# Patient Record
Sex: MALE | Race: BLACK OR AFRICAN AMERICAN | NOT HISPANIC OR LATINO | Employment: OTHER | URBAN - METROPOLITAN AREA
[De-identification: names, ages, dates, MRNs, and addresses within clinical notes are randomized per-mention and may not be internally consistent; named-entity substitution may affect disease eponyms.]

---

## 2024-05-18 ENCOUNTER — HOSPITAL ENCOUNTER (EMERGENCY)
Facility: HOSPITAL | Age: 39
Discharge: HOME | End: 2024-05-18
Attending: EMERGENCY MEDICINE
Payer: MEDICAID

## 2024-05-18 VITALS
HEIGHT: 69 IN | DIASTOLIC BLOOD PRESSURE: 92 MMHG | WEIGHT: 162 LBS | TEMPERATURE: 99 F | RESPIRATION RATE: 18 BRPM | HEART RATE: 71 BPM | BODY MASS INDEX: 23.99 KG/M2 | SYSTOLIC BLOOD PRESSURE: 137 MMHG | OXYGEN SATURATION: 98 %

## 2024-05-18 DIAGNOSIS — S61.411A LACERATION OF RIGHT HAND, FOREIGN BODY PRESENCE UNSPECIFIED, INITIAL ENCOUNTER: Primary | ICD-10-CM

## 2024-05-18 PROCEDURE — 63600175 PHARM REV CODE 636 W HCPCS

## 2024-05-18 PROCEDURE — 12002 RPR S/N/AX/GEN/TRNK2.6-7.5CM: CPT

## 2024-05-18 PROCEDURE — 90715 TDAP VACCINE 7 YRS/> IM: CPT

## 2024-05-18 PROCEDURE — 25000003 PHARM REV CODE 250

## 2024-05-18 PROCEDURE — 90471 IMMUNIZATION ADMIN: CPT

## 2024-05-18 PROCEDURE — 99284 EMERGENCY DEPT VISIT MOD MDM: CPT | Mod: 25

## 2024-05-18 RX ORDER — ACETAMINOPHEN 500 MG
500 TABLET ORAL EVERY 6 HOURS PRN
Qty: 30 TABLET | Refills: 0 | Status: SHIPPED | OUTPATIENT
Start: 2024-05-18

## 2024-05-18 RX ORDER — LIDOCAINE HYDROCHLORIDE 10 MG/ML
10 INJECTION INFILTRATION; PERINEURAL
Status: COMPLETED | OUTPATIENT
Start: 2024-05-18 | End: 2024-05-18

## 2024-05-18 RX ORDER — MUPIROCIN 20 MG/G
1 OINTMENT TOPICAL
Status: COMPLETED | OUTPATIENT
Start: 2024-05-18 | End: 2024-05-18

## 2024-05-18 RX ADMIN — MUPIROCIN 1 TUBE: 20 OINTMENT TOPICAL at 05:05

## 2024-05-18 RX ADMIN — LIDOCAINE HYDROCHLORIDE 10 ML: 10 INJECTION, SOLUTION INFILTRATION; PERINEURAL at 05:05

## 2024-05-18 RX ADMIN — TETANUS TOXOID, REDUCED DIPHTHERIA TOXOID AND ACELLULAR PERTUSSIS VACCINE, ADSORBED 0.5 ML: 5; 2.5; 8; 8; 2.5 SUSPENSION INTRAMUSCULAR at 05:05

## 2024-05-18 NOTE — Clinical Note
"Gio Fentonjeremy Grier was seen and treated in our emergency department on 5/18/2024.  He may return to work on 05/18/2024.       If you have any questions or concerns, please don't hesitate to call.      TERRI Young RN    "

## 2024-05-18 NOTE — Clinical Note
"Gio Fentonjeremy Grier was seen and treated in our emergency department on 5/18/2024.  He may return to work on 05/19/2024.       If you have any questions or concerns, please don't hesitate to call.      TERRI Young RN    "

## 2024-05-18 NOTE — ED PROVIDER NOTES
Encounter Date: 5/18/2024       History     Chief Complaint   Patient presents with    Laceration     Pt reporting he has a lac to right hand after a nail cut it open while moving furniture today. Bleeding controlled, Lac approx 4-5 cm in length. Pt unsure of when last tetanus shot was. Lac dressed in triage.      Pt is a 38-year-old male with no PMH who presents to the emergency department for a laceration to the R hand. Pt was moving a couch through a door and cut his hand on a nail embedded in the couch. Pt denies decreased sensation or strength of the hand or fingers. Pt denies possible foreign body, as the entire nail was retained in the couch. Denies use of blood thinners. Pt is unaware of last Tetanus vaccination. Pt denies fever, chest pain, or palpitations. Denies nausea or vomiting.     The history is provided by the patient. A  was used (Aisha #[id number]).     Review of patient's allergies indicates:  No Known Allergies  History reviewed. No pertinent past medical history.  History reviewed. No pertinent surgical history.  No family history on file.  Social History     Tobacco Use    Smoking status: Never    Smokeless tobacco: Never   Substance Use Topics    Alcohol use: Never    Drug use: Never     Review of Systems   Constitutional:  Negative for chills, diaphoresis and fever.   Respiratory:  Negative for cough, chest tightness, shortness of breath and wheezing.    Cardiovascular:  Negative for chest pain and palpitations.   Gastrointestinal:  Negative for nausea and vomiting.   Skin:  Positive for wound. Negative for color change and pallor.   Neurological:  Negative for dizziness, syncope and weakness.       Physical Exam     Initial Vitals   BP Pulse Resp Temp SpO2   05/18/24 1710 05/18/24 1710 05/18/24 1710 05/18/24 1712 05/18/24 1710   (!) 137/92 71 18 98.7 °F (37.1 °C) 98 %      MAP       --                Physical Exam    Constitutional: He appears well-developed and  well-nourished.   HENT:   Head: Normocephalic and atraumatic.   Cardiovascular:  Normal rate and regular rhythm.     Exam reveals no gallop and no friction rub.       No murmur heard.  Pulmonary/Chest: Breath sounds normal. No respiratory distress. He has no wheezes. He has no rhonchi. He has no rales. He exhibits no tenderness.   Musculoskeletal:         General: Normal range of motion.     Neurological: He is alert and oriented to person, place, and time. He has normal strength. No sensory deficit.   Skin: Skin is warm and dry. Capillary refill takes less than 2 seconds.   Psychiatric: He has a normal mood and affect.       Hand/Wrist    Hand/Wrist inspection additional comments: 6cm linear laceration on the volar surface at the base of the thumb extended to, but not crossing, the PIP joint. The laceration does not extend to the tendon. Pt remains with full sensation of the ulnar and radial surfaces of thumb. No decreased sensation of palm. 5/5 strength of hand. Full ROM of thumb with active and passive motion.       ED Course   Lac Repair    Date/Time: 5/18/2024 7:21 PM    Performed by: Nicolas Amaya PA-C  Authorized by: Arun Matamoros MD    Consent:     Consent obtained:  Verbal    Consent given by:  Patient    Risks, benefits, and alternatives were discussed: yes      Risks discussed:  Infection, pain, retained foreign body, need for additional repair, poor cosmetic result and poor wound healing    Alternatives discussed:  No treatment  Universal protocol:     Procedure explained and questions answered to patient or proxy's satisfaction: yes      Patient identity confirmed:  Verbally with patient  Anesthesia:     Anesthesia method:  Local infiltration    Local anesthetic:  Lidocaine 1% w/o epi  Laceration details:     Location:  Hand    Hand location:  R palm    Length (cm):  6  Treatment:     Area cleansed with:  Povidone-iodine and saline    Amount of cleaning:  Standard  Skin repair:     Repair  method:  Sutures    Suture size:  5-0    Suture material:  Nylon    Suture technique:  Simple interrupted    Number of sutures:  6  Approximation:     Approximation:  Close  Repair type:     Repair type:  Simple  Post-procedure details:     Dressing:  Open (no dressing)    Procedure completion:  Tolerated  Comments:      Laceration repaired with 6 suture. Nonadherent bandage applied. Patient tolerated well.  Instructed patient to keep the area clean and dry and watch out for signs of infection including erythema, warmth, pus discharge, and fevers at home. Instructed patient he will need to return in 7-10 days to have sutures removed, he verbalized understanding.      Labs Reviewed - No data to display       Imaging Results    None          Medications   Tdap (BOOSTRIX) vaccine injection 0.5 mL (0.5 mLs Intramuscular Given 5/18/24 1740)   LIDOcaine HCL 10 mg/ml (1%) injection 10 mL (10 mLs Infiltration Given 5/18/24 1737)   mupirocin 2 % ointment 1 Tube (1 Tube Topical (Top) Given 5/18/24 1738)     Medical Decision Making  Pt is a 38-year-old male with no PMH who presents to the emergency department for a laceration to the R hand. Pt was moving a couch through a door and cut his hand on a nail embedded in the couch. Pt denies decreased sensation or strength of the hand or fingers. Pt denies possible foreign body, as the entire nail was retained in the couch. Denies use of blood thinners. Pt is unaware of last Tetanus vaccination. Pt denies fever, chest pain, or palpitations. Denies nausea or vomiting.   Patient's chart and medical history reviewed.  Patient's vitals reviewed.  They are afebrile, no respiratory distress, nontoxic-appearing in the ED.  This is an evaluation of a 38 y.o. male that presents to the Emergency Department for a Laceration. Physical Exam shows a non-toxic, afebrile, and well appearing male. There is a laceration to the right thenar area. There is no surrounding erythema or increased warmth.  Compartments are soft. There is full ROM of hand against resistance. Equal sensation proximally and distally to the wound. No visible tendon lacerations or bony structures observed on exam. The wound was irrigated and visually inspected with no visible foreign bodies identified. Vital Signs Are Reassuring. Tetanus is not up to date. Tetanus given    RESULTS: . The wound was closed per the procedure note.     My overall impression is Laceration of the right palm. I considered, but at this time, do not suspect cellulitis, compartment syndrome, underlying fracture, no tendon injury, or suspect any retained foreign body at this time.   D/C Information: Laceration, Suture Removal, and Wound Care instructions given. The diagnosis, treatment plan, instructions for follow-up and reevaluation with his PCP or Return to ED for suture removal as well as ED return precautions were discussed and understanding was verbalized.   I discussed with the patient/family the diagnosis, treatment plan, indications for return to the emergency department, and for expected follow-up. The patient/family verbalized an understanding. The patient/family is asked if there are any questions or concerns. We discuss the case, until all issues are addressed to the patient/family's satisfaction. Patient/family understands and is agreeable to the plan.   LINA JOSEPH    DISCLAIMER: This note was prepared with [employer] voice recognition transcription software. Garbled syntax, mangled pronouns, and other bizarre constructions may be attributed to that software system.        Risk  Prescription drug management.                                      Clinical Impression:  Final diagnoses:  [W89.566R] Laceration of right hand, foreign body presence unspecified, initial encounter (Primary)          ED Disposition Condition    Discharge Stable          ED Prescriptions       Medication Sig Dispense Start Date End Date Auth. Provider    acetaminophen (TYLENOL)  500 MG tablet Take 1 tablet (500 mg total) by mouth every 6 (six) hours as needed for Pain. 30 tablet 5/18/2024 -- Nicolas Amaya PA-C          Follow-up Information       Follow up With Specialties Details Why Contact Info    [facility] -  Schedule an appointment as soon as possible for a visit in 1 day for follow up [address] [place] [address]  [phone]      [facility] - Emergency Dept Emergency Medicine Go in 1 week For suture removal [address] [facility] [place] [id number] [phone]             Nicolas Amaya PA-C  05/18/24 1931

## 2024-05-19 NOTE — DISCHARGE INSTRUCTIONS
Please keep your wound clean and dry.  Wash gently with soap and water and apply antibiotic ointment (bacitracin, neosporin, etc.) over the wound after washing. Please watch for signs of infection including: increased\spreading redness, swelling, pus-like discharge, or a fever greater than 100.4F. If you experience any of these, please contact your Primary Care Doctor or Return to the Emergency Department for a wound check.     Please follow up with your Primary Care Doctor in 2-3 days for wound recheck and 7-10 days for suture removal.  You may return to the Emergency Department if you are unable to see your Primary Care Doctor.  Please return to the ER for any new or worsening symptoms.    A healing laceration should not be exposed to direct sunlight because of the risk for hyperpigmentation (darkening of the skin). It is recommended that you use sunscreen on lacerations or abrasions to help prevent the development of hyperpigmentation once the wounds are healed.   Thank you for coming to our Emergency Department today. It is important to remember that some problems or medical conditions are difficult to diagnose and may not be found or addressed during your Emergency Department visit.  These conditions often start with non-specific symptoms and can only be diagnosed on follow up visits with your primary care physician or specialist when the symptoms continue or change. Please remember that all medical conditions can change, and we cannot predict how you will be feeling tomorrow or the next day. Return to the ER with any questions/concerns, new/concerning symptoms, worsening or failure to improve. Also, please follow up with your Primary Care Physician and/or Pediatrician in the next 1-2 days to review your ED visit in entirety and for re-evaluation.   Be sure to follow up with your primary care doctor and review all labs/imaging/tests that were performed during your ER visit with them. It is very common for us to  identify non-emergent incidental findings which must be followed up with your primary care physician.  Some labs/imaging/tests may be outside of the normal range, and require non-emergent follow-up and/or further investigation/treatment/procedures/testing to help diagnose/exclude/prevent complications or other potentially serious medical conditions. Some abnormalities may not have been discussed or addressed during your ER visit. Some lab results may not return during your ER visit but can be accessible by downloading the free [facility] Mychart blade or by visiting [email or web address] . It is important for you to review all labs/imaging/tests which are outside of the normal range with your physician.  An ER visit does not replace a primary care visit, and many screening tests or follow-up tests cannot be ordered by an ER doctor or performed by the ER. Some tests may even require pre-approval.  If you do not have a primary care doctor, you may contact the one listed on your discharge paperwork or you may also call the [facility] Appointment Desk at [phone] , or [employer] at  [phone] to schedule an appointment, or establish care with a primary care doctor or even a specialist and to obtain information about local resources. It is important to your health that you have a primary care doctor.  Please take all medications as directed. We have done our best to select a medication for you that will treat your condition however, all medications may potentially have side-effects and it is impossible to predict which medications may give you side-effects or what those side-effects (if any) those medications may give you.  If you feel that you are having a negative effect or side-effect of any medication you should stop taking those medications immediately and seek medical attention. If you feel that you are having a life-threatening reaction call [phone].  Do not drive, swim, climb to height, take a bath,  operate heavy machinery, drink alcohol or take potentially sedating medications, sign any legal documents or make any important decisions for 24 hours if you have received any pain medications, sedatives or mood altering drugs during your ER visit or within 24 hours of taking them if they have been prescribed to you.   You can find additional resources for Dentists, hearing aids, durable medical equipment, low cost pharmacies and other resources at [email or web address]  Patient agrees with this plan. Discussed with her strict return precautions, they verbalized understanding. Patient is stable for discharge.